# Patient Record
Sex: FEMALE | Race: WHITE
[De-identification: names, ages, dates, MRNs, and addresses within clinical notes are randomized per-mention and may not be internally consistent; named-entity substitution may affect disease eponyms.]

---

## 2017-04-02 ENCOUNTER — HOSPITAL ENCOUNTER (EMERGENCY)
Dept: HOSPITAL 26 - MED | Age: 52
Discharge: HOME | End: 2017-04-02
Payer: COMMERCIAL

## 2017-04-02 VITALS — BODY MASS INDEX: 31.65 KG/M2 | WEIGHT: 172 LBS | HEIGHT: 62 IN

## 2017-04-02 VITALS — SYSTOLIC BLOOD PRESSURE: 145 MMHG | DIASTOLIC BLOOD PRESSURE: 79 MMHG

## 2017-04-02 VITALS — DIASTOLIC BLOOD PRESSURE: 76 MMHG | SYSTOLIC BLOOD PRESSURE: 135 MMHG

## 2017-04-02 DIAGNOSIS — S63.642A: Primary | ICD-10-CM

## 2017-04-02 DIAGNOSIS — I10: ICD-10-CM
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
## 2017-04-02 DIAGNOSIS — Z79.899: ICD-10-CM
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
## 2017-04-02 DIAGNOSIS — Z79.82: ICD-10-CM

## 2017-04-02 PROCEDURE — 36415 COLL VENOUS BLD VENIPUNCTURE: CPT

## 2017-04-02 PROCEDURE — 29125 APPL SHORT ARM SPLINT STATIC: CPT

## 2017-04-02 PROCEDURE — 99285 EMERGENCY DEPT VISIT HI MDM: CPT

## 2017-04-02 PROCEDURE — 84550 ASSAY OF BLOOD/URIC ACID: CPT

## 2017-04-02 PROCEDURE — 96372 THER/PROPH/DIAG INJ SC/IM: CPT

## 2017-04-02 PROCEDURE — 73130 X-RAY EXAM OF HAND: CPT

## 2017-04-02 NOTE — NUR
PT PRESENTS TO ER W/C/O LEFT THUMB PAIN X1 WEEK. PT STATES SHE WOKE UP 1 WEEK 
AGO AND FELT PAIN. PT DENIES ANY FALL OR TRAUMA. HX HTN.LEFT THUMB IS SLIGHTLY 
SWOLLEN;NO REDNESS NOTED;AAOX4 NO ACUTE DISTRESS NOTED AT THIS TIME;SKIN IS 
INTACT;WARM TO TOUCH AND DRY;HOB ELEVATED;NEEDS ATTENDED;SAFETY MEASURES 
DONE;MD MADE AWARE OF PT'S CONDITION.

## 2017-04-02 NOTE — NUR
Patient discharged with v/s stable. Written and verbal after care instructions 
given and explained. Patient alert, oriented and verbalized understanding of 
instructions. Ambulatory with steady gait. All questions addressed prior to 
discharge. ID band removed. Patient advised to follow up with PMD. Rx of 
TRAMADOL given. Patient educated on indication of medication including possible 
reaction and side effects. Opportunity to ask questions provided and answered.

## 2018-05-01 ENCOUNTER — HOSPITAL ENCOUNTER (EMERGENCY)
Dept: HOSPITAL 26 - MED | Age: 53
Discharge: HOME | End: 2018-05-01
Payer: COMMERCIAL

## 2018-05-01 VITALS — HEIGHT: 63 IN | BODY MASS INDEX: 30.3 KG/M2 | WEIGHT: 171 LBS

## 2018-05-01 VITALS — SYSTOLIC BLOOD PRESSURE: 139 MMHG | DIASTOLIC BLOOD PRESSURE: 71 MMHG

## 2018-05-01 VITALS — DIASTOLIC BLOOD PRESSURE: 71 MMHG | SYSTOLIC BLOOD PRESSURE: 139 MMHG

## 2018-05-01 DIAGNOSIS — Z79.899: ICD-10-CM

## 2018-05-01 DIAGNOSIS — I10: ICD-10-CM

## 2018-05-01 DIAGNOSIS — H60.8X1: Primary | ICD-10-CM

## 2018-05-01 NOTE — NUR
Patient discharged with v/s stable. Written and verbal after care instructions 
given and explained. 

Patient alert, oriented and verbalized understanding of instructions. 
Ambulatory with steady gait. All questions addressed prior to discharge. ID 
band removed. Patient advised to follow up with PMD. Rx of Cortisporin given. 
Patient educated on indication of medication including possible reaction and 
side effects. Opportunity to ask questions provided and answered.
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
## 2019-02-24 ENCOUNTER — HOSPITAL ENCOUNTER (EMERGENCY)
Dept: HOSPITAL 26 - MED | Age: 54
Discharge: HOME | End: 2019-02-24
Payer: COMMERCIAL

## 2019-02-24 VITALS — DIASTOLIC BLOOD PRESSURE: 74 MMHG | SYSTOLIC BLOOD PRESSURE: 145 MMHG

## 2019-02-24 VITALS — DIASTOLIC BLOOD PRESSURE: 72 MMHG | SYSTOLIC BLOOD PRESSURE: 148 MMHG

## 2019-02-24 VITALS — BODY MASS INDEX: 32.43 KG/M2 | WEIGHT: 183 LBS | HEIGHT: 63 IN

## 2019-02-24 DIAGNOSIS — N39.0: Primary | ICD-10-CM

## 2019-02-24 DIAGNOSIS — Z79.899: ICD-10-CM

## 2019-02-24 DIAGNOSIS — I10: ICD-10-CM

## 2019-02-24 LAB
APPEARANCE UR: CLEAR
BILIRUB UR QL STRIP: NEGATIVE
COLOR UR: YELLOW
GLUCOSE UR STRIP-MCNC: NEGATIVE MG/DL
HGB UR QL STRIP: (no result)
LEUKOCYTE ESTERASE UR QL STRIP: (no result)
NITRITE UR QL STRIP: POSITIVE
PH UR STRIP: 6 [PH] (ref 5–9)
RBC #/AREA URNS HPF: (no result) /HPF (ref 0–5)
WBC,URINE: (no result) /HPF (ref 0–5)

## 2019-02-24 PROCEDURE — 96372 THER/PROPH/DIAG INJ SC/IM: CPT

## 2019-02-24 PROCEDURE — 81001 URINALYSIS AUTO W/SCOPE: CPT

## 2019-02-24 PROCEDURE — 87086 URINE CULTURE/COLONY COUNT: CPT

## 2019-02-24 PROCEDURE — 81025 URINE PREGNANCY TEST: CPT

## 2019-02-24 PROCEDURE — 99284 EMERGENCY DEPT VISIT MOD MDM: CPT

## 2019-02-24 NOTE — NUR
Patient discharged with v/s stable. Written and verbal after care instructions 
given and explained. 

Patient alert, oriented and verbalized understanding of instructions. 
Ambulatory with steady gait. All questions addressed prior to discharge. ID 
band removed. Patient advised to follow up with PMD. Rx of LEVAQUIN, PYRIDIUM 
given. Patient educated on indication of medication including possible reaction 
and side effects. Opportunity to ask questions provided and answered.

## 2019-02-24 NOTE — NUR
55 YO F BIB FAMILY W/ C/O LEFT UPPER ABD PAIN X 3 DAYS W/ INTERMITTENT FEVER. 
SAW PMD FOR SAME THING A MONTH AGO AND GIVEN ABX (AMOXICILLIN) FOR UTI. PT 
REPORTS THAT THE SYMPTOMS HAVE NOT IMPROVED. REPORTS FREQUENCY/URGENCY AND 
DYSURIA. DENIES N/V. AAOX4. PATIENT REPORTS PAIN 8/10

BED IS DOWN, LOCKED, BED RAIL X 1, ERMD NOTIFIED OF PATIENT STATUS



HX CARDIAC CATHETERIZATION 2 YEARS AGO, HTN

RX BENAZEPRIL, OMEPRAZOLE, VIT D